# Patient Record
Sex: FEMALE | Race: WHITE | Employment: OTHER | URBAN - METROPOLITAN AREA
[De-identification: names, ages, dates, MRNs, and addresses within clinical notes are randomized per-mention and may not be internally consistent; named-entity substitution may affect disease eponyms.]

---

## 2021-08-06 ENCOUNTER — APPOINTMENT (OUTPATIENT)
Dept: GENERAL RADIOLOGY | Age: 56
End: 2021-08-06
Attending: EMERGENCY MEDICINE
Payer: COMMERCIAL

## 2021-08-06 ENCOUNTER — HOSPITAL ENCOUNTER (EMERGENCY)
Age: 56
Discharge: HOME | End: 2021-08-06
Payer: COMMERCIAL

## 2021-08-06 VITALS
OXYGEN SATURATION: 100 % | TEMPERATURE: 98.8 F | SYSTOLIC BLOOD PRESSURE: 133 MMHG | WEIGHT: 250 LBS | BODY MASS INDEX: 37.03 KG/M2 | HEART RATE: 85 BPM | RESPIRATION RATE: 16 BRPM | DIASTOLIC BLOOD PRESSURE: 73 MMHG | HEIGHT: 69 IN

## 2021-08-06 DIAGNOSIS — S42.001A CLOSED DISPLACED FRACTURE OF RIGHT CLAVICLE, UNSPECIFIED PART OF CLAVICLE, INITIAL ENCOUNTER: Primary | ICD-10-CM
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
## 2021-08-06 PROCEDURE — 99283 EMERGENCY DEPT VISIT LOW MDM: CPT

## 2021-08-06 PROCEDURE — 73030 X-RAY EXAM OF SHOULDER: CPT

## 2021-08-06 PROCEDURE — 74011250637 HC RX REV CODE- 250/637: Performed by: PHYSICIAN ASSISTANT

## 2021-08-06 RX ORDER — OXYCODONE AND ACETAMINOPHEN 5; 325 MG/1; MG/1
1 TABLET ORAL
Status: COMPLETED | OUTPATIENT
Start: 2021-08-06 | End: 2021-08-06

## 2021-08-06 RX ORDER — HYDROCODONE BITARTRATE AND ACETAMINOPHEN 5; 325 MG/1; MG/1
1 TABLET ORAL
Status: DISCONTINUED | OUTPATIENT
Start: 2021-08-06 | End: 2021-08-06

## 2021-08-06 RX ORDER — OXYCODONE AND ACETAMINOPHEN 5; 325 MG/1; MG/1
1 TABLET ORAL
Qty: 10 TABLET | Refills: 0 | Status: SHIPPED | OUTPATIENT
Start: 2021-08-06 | End: 2021-08-09

## 2021-08-06 RX ADMIN — OXYCODONE HYDROCHLORIDE AND ACETAMINOPHEN 1 TABLET: 5; 325 TABLET ORAL at 15:23

## 2021-08-06 NOTE — ED PROVIDER NOTES
EMERGENCY DEPARTMENT HISTORY AND PHYSICAL EXAM      Date: 8/6/2021  Patient Name: Hugh Navarrete    History of Presenting Illness     Chief Complaint   Patient presents with    Shoulder Injury       History Provided By: Patient    HPI: Hugh Navarrete, 49 y.o. female with a past medical history significant No significant past medical history presents to the ED with cc of right shoulder and clavicle pain onset 3 days ago, no improvement of pain. Patient reports that she was mowing the grass with a push mower on an incline when she tilted and fell onto her right side into a concrete ditch. She was not evaluated just after the injury. She specifically denies a history of surgery to the right shoulder, numbness, tingling, head injury, loss of consciousness, neck pain, back pain. There are no other complaints, changes, or physical findings at this time. PCP: Deo Lam MD    No current facility-administered medications on file prior to encounter. No current outpatient medications on file prior to encounter. Past History     Past Medical History:  No past medical history on file. Past Surgical History:  No past surgical history on file. Family History:  No family history on file. Social History:  Social History     Tobacco Use    Smoking status: Not on file   Substance Use Topics    Alcohol use: Not on file    Drug use: Not on file       Allergies: Allergies   Allergen Reactions    Ativan [Lorazepam] Unknown (comments)    Nsaids (Non-Steroidal Anti-Inflammatory Drug) Unknown (comments)    Seroquel [Quetiapine] Unknown (comments)     SVT         Review of Systems   Review of Systems   Constitutional: Negative for activity change, chills and fever. HENT: Negative for congestion, ear pain, rhinorrhea, sneezing and sore throat. Eyes: Negative for pain and visual disturbance. Respiratory: Negative for cough and shortness of breath. Cardiovascular: Negative for chest pain. Gastrointestinal: Negative for abdominal pain, diarrhea, nausea and vomiting. Genitourinary: Negative for dysuria and hematuria. Musculoskeletal: Positive for arthralgias. Negative for gait problem. Skin: Negative for rash. Neurological: Negative for speech difficulty, weakness and headaches. Psychiatric/Behavioral: The patient is not nervous/anxious. All other systems reviewed and are negative. Physical Exam   Physical Exam  Vitals and nursing note reviewed. Constitutional:       General: She is not in acute distress. Appearance: Normal appearance. She is not ill-appearing or toxic-appearing. HENT:      Head: Normocephalic and atraumatic. Nose: Nose normal.      Mouth/Throat:      Mouth: Mucous membranes are moist.   Eyes:      Extraocular Movements: Extraocular movements intact. Conjunctiva/sclera: Conjunctivae normal.      Pupils: Pupils are equal, round, and reactive to light. Cardiovascular:      Rate and Rhythm: Normal rate. Pulses: Normal pulses. Heart sounds: Normal heart sounds. Pulmonary:      Effort: Pulmonary effort is normal. No respiratory distress. Breath sounds: Normal breath sounds. Musculoskeletal:         General: No deformity or signs of injury. Right shoulder: Swelling and tenderness present. Decreased range of motion (flexion). Decreased strength. Normal pulse. Left shoulder: Normal.      Cervical back: Normal range of motion. Comments: +ecchymosis of the right upper arm and overlaying the clavicle with tenderness to palpation   Skin:     General: Skin is warm and dry. Capillary Refill: Capillary refill takes less than 2 seconds. Findings: No rash. Neurological:      General: No focal deficit present. Mental Status: She is alert and oriented to person, place, and time. Cranial Nerves: No cranial nerve deficit.    Psychiatric:         Mood and Affect: Mood normal.         Diagnostic Study Results Labs -   No results found for this or any previous visit (from the past 48 hour(s)). Radiologic Studies -   Results from [place] encounter on 08/06/21    XR SHOULDER RT AP/LAT MIN 2 V    Narrative  History is fall and pain. No comparison study is available. 3 views of the right shoulder demonstrate a displaced fracture of the distal  right clavicle with one full shaft width displacement of the distal fracture  fragment. The [facility] joint is intact. The patient has had right axillary surgical  clips placed. There is a left subclavian triple lead AICD pacemaker present. The  visualized right lung is clear. No rib fracture is identified. Impression  Displaced fracture of the distal right clavicle without AC joint  dislocation. Right lateral thoracic surgical clips. Triple lead AICD pacemaker  present. CT Results  (Last 48 hours)    None          Medical Decision Making   I am the first provider for this patient. I reviewed the vital signs, available nursing notes, past medical history, past surgical history, family history and social history. Vital Signs-Reviewed the patient's vital signs. Wt Readings from Last 3 Encounters:   08/06/21 113.4 kg (250 lb)     Temp Readings from Last 3 Encounters:   08/06/21 98.8 °F (37.1 °C)     BP Readings from Last 3 Encounters:   08/06/21 133/73     Pulse Readings from Last 3 Encounters:   08/06/21 85       No data found. Records Reviewed: Nursing Notes and Old Medical Records    Provider Notes (Medical Decision Making):     MDM  Number of Diagnoses or Management Options  Closed displaced fracture of right clavicle, unspecified part of clavicle, initial encounter  Fall from ground level  Diagnosis management comments: 53-year-old female presenting with right shoulder pain after ground-level fall onto her right side. X-ray reveals displaced fracture of the distal clavicle without dislocation. She is neurovascularly intact.   Patient is not from this [facility] and is having a friend drive her back to [place] as soon as possible so she can follow-up with her orthopedic specialist.  She has been given someone local just in case she does stay around over the next couple of days. She has been provided with a copy of the x-ray. She has been placed in a shoulder sling and given pain medication. Amount and/or Complexity of Data Reviewed  Tests in the radiology section of CPT®: ordered and reviewed        ED Course:   Initial assessment performed. The patients presenting problems have been discussed, and they are in agreement with the care plan formulated and outlined with them. I have encouraged them to ask questions as they arise throughout their visit. PROCEDURES    Procedures       Disposition     Disposition: DC- Adult Discharges: All of the diagnostic tests were reviewed and questions answered. Diagnosis, care plan and treatment options were discussed. The patient understands the instructions and will follow up as directed. The patients results have been reviewed with them. They have been counseled regarding their diagnosis. The patient verbally convey understanding and agreement of the signs, symptoms, diagnosis, treatment and prognosis and additionally agrees to follow up as recommended with their PCP in 24 - 48 hours. They also agree with the care-plan and convey that all of their questions have been answered. I have also put together some discharge instructions for them that include: 1) educational information regarding their diagnosis, 2) how to care for their diagnosis at home, as well a 3) list of reasons why they would want to return to the ED prior to their follow-up appointment, should their condition change. Discharged    DISCHARGE PLAN:  1.    Current Discharge Medication List      START taking these medications    Details   oxyCODONE-acetaminophen (Percocet) 5-325 mg per tablet Take 1 Tablet by mouth every six (6) hours as needed for Pain for up to 3 days. Max Daily Amount: 4 Tablets. Qty: 10 Tablet, Refills: 0    Associated Diagnoses: Closed displaced fracture of right clavicle, unspecified part of clavicle, initial encounter           2. Follow-up Information     Follow up With Specialties Details Why Contact Info    Yossi Harris MD Internal Medicine Schedule an appointment as soon as possible for a visit  for follow up from ER visit [address] [facility] [address]      [facility] EMERGENCY DEPT Emergency Medicine  As needed, If symptoms worsen [facility] [address] in your home state  Call   call for follow up        3. Return to ED if worse   4. Discharge Medication List as of 8/6/2021  4:00 PM          Diagnosis     Clinical Impression:   1. Closed displaced fracture of right clavicle, unspecified part of clavicle, initial encounter    2.  Fall from ground level

## 2021-08-06 NOTE — ED TRIAGE NOTES
Pt states she was using a push mower 3 days ago, and fell into a concrete ditch. C/o right shoulder pain.

## 2021-08-06 NOTE — ED NOTES
Discharge instructions and medications reviewed with patient. Patient verbalized understanding. Patient given copy of x-ray.